# Patient Record
Sex: FEMALE
[De-identification: names, ages, dates, MRNs, and addresses within clinical notes are randomized per-mention and may not be internally consistent; named-entity substitution may affect disease eponyms.]

---

## 2018-05-29 ENCOUNTER — HOSPITAL ENCOUNTER (EMERGENCY)
Dept: HOSPITAL 97 - ER | Age: 38
Discharge: HOME | End: 2018-05-29
Payer: SELF-PAY

## 2018-05-29 DIAGNOSIS — J06.9: Primary | ICD-10-CM

## 2018-05-29 DIAGNOSIS — Z72.0: ICD-10-CM

## 2018-05-29 PROCEDURE — 99282 EMERGENCY DEPT VISIT SF MDM: CPT

## 2018-05-29 NOTE — ER
Nurse's Notes                                                                                     

 [facility]                                                                

Name: Nati Murcia                                                                               

Age: 37 yrs                                                                                       

Sex: Female                                                                                       

: 1980                                                                                   

MRN: [id number]                                                                                   

Arrival Date: 2018                                                                          

Time: 19:15                                                                                       

Account#: [id number]                                                                            

Bed 14                                                                                            

Private MD:                                                                                       

Diagnosis: Acute upper respiratory infection, unspecified                                         

                                                                                                  

Presentation:                                                                                     

                                                                                             

19:33 Presenting complaint: Patient states: "I think I'm getting a sinus infection"; Began 2  lp1 

      days ago with head pressure, dizziness, headache, cough. Transition of care: patient        

      was not received from another setting of care. Onset of symptoms was May 27, 2018. Risk     

      Assessment: Do you want to hurt yourself or someone else? Patient reports no desire to      

      harm self or others. Initial Sepsis Screen: Does the patient meet any 2 criteria? No.       

      Patient's initial sepsis screen is negative. Does the patient have a suspected source       

      of infection? No. Patient's initial sepsis screen is negative. Care prior to arrival:       

      None.                                                                                       

19:33 Method Of Arrival: Ambulatory                                                           lp1 

19:33 Acuity: MITCH 4                                                                           lp1 

                                                                                                  

Triage Assessment:                                                                                

19:36 General: Appears in no apparent distress. Behavior is appropriate for age. Pain:        lp1 

      Complains of pain in head Pain currently is 9 out of 10 on a pain scale. Quality of         

      pain is described as pressure. Neuro: Level of Consciousness is awake, alert, obeys         

      commands, Oriented to person, place, time, situation. Respiratory: Respiratory effort       

      is even, unlabored. Derm: Skin is pink, warm \T\ dry.                                       

                                                                                                  

OB/GYN:                                                                                           

19:34 LMP N/A - Hysterectomy                                                                  lp1 

                                                                                                  

Historical:                                                                                       

- Allergies:                                                                                      

19:35 No Known Allergies;                                                                     lp1 

- Home Meds:                                                                                      

19:35 Butalbital Compound Oral [Active];                                                      lp1 

- PMHx:                                                                                           

19:35 Chronic headaches;                                                                      lp1 

- PSHx:                                                                                           

19:35 Tonsillectomy; Cholecystectomy; Partial hysterectomy;                                   lp1 

                                                                                                  

- Immunization history:: Adult Immunizations up to date.                                          

- Social history:: Smoking status: Patient uses tobacco products, denies chronic                  

  smoking, but will smoke occasionally.                                                           

- Ebola Screening: : No symptoms or risks identified at this time.                                

                                                                                                  

                                                                                                  

Screenin:36 Abuse screen: Denies threats or abuse. Denies injuries from another. Nutritional        lp1 

      screening: No deficits noted. Tuberculosis screening: No symptoms or risk factors           

      identified. Fall Risk None identified.                                                      

                                                                                                  

Assessment:                                                                                       

22:19 General: Appears in no apparent distress. well groomed, Behavior is calm, cooperative.  bb  

      Pain: Complains of pain in sinus area. Neuro: Level of Consciousness is awake, alert,       

      obeys commands, Oriented to person, place, time, situation. Cardiovascular: No deficits     

      noted. Respiratory: Respiratory effort is even, unlabored. GI: No signs and/or symptoms     

      were reported involving the gastrointestinal system. : No signs and/or symptoms were      

      reported regarding the genitourinary system. EENT: Reports nasal congestion. Derm: Skin     

      is pink, warm \T\ dry. Musculoskeletal: Circulation, motion, and sensation intact.          

22:21 Reassessment: pt verbalized understanding of and agrees to plan of care discharge       bb  

      instructions given pt ambulated with steady gait to exit accompanied by family.             

                                                                                                  

Vital Signs:                                                                                      

19:34  / 94; Pulse 80; Resp 18; Temp 98.7(O); Pulse Ox 98% on R/A; Weight 84.37 kg;     lp1 

      Height 5 ft. 3 in. (160.02 cm); Pain 9/10;                                                  

22:22  / 78; Pulse 77; Resp 18 S; Temp 98.1(O); Pulse Ox 100% on R/A;                   bb  

19:34 Body Mass Index 32.95 (84.37 kg, 160.02 cm)                                             lp1 

                                                                                                  

ED Course:                                                                                        

19:15 Patient arrived in ED.                                                                  as  

19:34 Triage completed.                                                                       lp1 

19:34 Arm band placed on right wrist.                                                         lp1 

21:28 Edmund Schmidt PA is [facility].                                                               [id number] 

21:28 Jeff Zazueta MD is Attending Physician.                                                [id number] 

21:32 Sola Motley, RN is Primary Nurse.                                                   bb  

22:19 Patient has correct armband on for positive identification. Call light in reach. Adult  bb  

      w/ patient.                                                                                 

22:19 No provider procedures requiring assistance completed. Patient did not have IV access   bb  

      during this emergency room visit.                                                           

                                                                                                  

Administered Medications:                                                                         

No medications were administered                                                                  

                                                                                                  

                                                                                                  

Outcome:                                                                                          

21:44 Discharge ordered by MD.                                                                [id number] 

22:22 Discharged to home ambulatory, with family.                                             bb  

22:22 Condition: stable                                                                           

22:22 Discharge instructions given to patient, Instructed on discharge instructions, follow       

      up and referral plans. medication usage, Demonstrated understanding of instructions,        

      follow-up care, medications, Prescriptions given X 2.                                       

22:23 Patient left the ED.                                                                    bb  

                                                                                                  

Signatures:                                                                                       

Annie Centeno Brenda RN                     RN   bb                                                   

Fadia Ramsey RN                         RN   lp1                                                  

Edmund Schmidt PA PA   jr8                                                  

                                                                                                  

**************************************************************************************************

## 2018-05-29 NOTE — EDPHYS
Physician Documentation                                                                           

 [facility]                                                                

Name: Nati Murcia                                                                               

Age: 37 yrs                                                                                       

Sex: Female                                                                                       

: 1980                                                                                   

MRN: [id number]                                                                                   

Arrival Date: 2018                                                                          

Time: 19:15                                                                                       

Account#: [id number]                                                                            

Bed 14                                                                                            

Private MD:                                                                                       

ED Physician Jeff Zazueta                                                                         

HPI:                                                                                              

                                                                                             

21:40 This 37 yrs old  Female presents to ER via Ambulatory with complaints of Sinus  jr8 

      Congestion.                                                                                 

21:40 The patient or guardian reports cough, that is intermittent, described as mild, with    jr8 

      productive sputum, that is green. Onset: The symptoms/episode began/occurred acutely,       

      yesterday. Severity of symptoms: At their worst the symptoms were mild, in the              

      emergency department the symptoms are unchanged. Modifying factors: The symptoms are        

      alleviated by nothing, the symptoms are aggravated by nothing. Associated signs and         

      symptoms: Pertinent positives: earache, fever, rhinorrhea, sinus congestion, headache .     

      The patient has not experienced similar symptoms in the past. The patient has not           

      recently seen a physician.                                                                  

                                                                                                  

OB/GYN:                                                                                           

19:34 LMP N/A - Hysterectomy                                                                  lp1 

                                                                                                  

Historical:                                                                                       

- Allergies:                                                                                      

19:35 No Known Allergies;                                                                     lp1 

- Home Meds:                                                                                      

19:35 Butalbital Compound Oral [Active];                                                      lp1 

- PMHx:                                                                                           

19:35 Chronic headaches;                                                                      lp1 

- PSHx:                                                                                           

19:35 Tonsillectomy; Cholecystectomy; Partial hysterectomy;                                   lp1 

                                                                                                  

- Immunization history:: Adult Immunizations up to date.                                          

- Social history:: Smoking status: Patient uses tobacco products, denies chronic                  

  smoking, but will smoke occasionally.                                                           

- Ebola Screening: : No symptoms or risks identified at this time.                                

                                                                                                  

                                                                                                  

ROS:                                                                                              

21:40 Eyes: Negative for injury, pain, redness, and discharge, Neck: Negative for injury,     jr8 

      pain, and swelling, Cardiovascular: Negative for chest pain, palpitations, and edema,       

      Abdomen/GI: Negative for abdominal pain, nausea, vomiting, diarrhea, and constipation,      

      Back: Negative for injury and pain, MS/Extremity: Negative for injury and deformity,        

      Skin: Negative for injury, rash, and discoloration.                                         

21:40 Constitutional: Positive for malaise.                                                       

21:40 ENT: Positive for ear pain, rhinorrhea, sinus congestion.                                   

21:40 Respiratory: Positive for cough, with green sputum.                                         

21:40 Neuro: Positive for headache, Negative for altered mental status, dizziness, gait           

      disturbance, hearing loss, loss of consciousness, numbness, seizure activity, speech        

      changes, syncope, near syncope, tingling, tinnitus, tremor, visual changes, weakness.       

                                                                                                  

Exam:                                                                                             

21:40 Eyes:  Pupils equal round and reactive to light, extra-ocular motions intact.  Lids and jr8 

      lashes normal.  Conjunctiva and sclera are non-icteric and not injected.  Cornea within     

      normal limits.  Periorbital areas with no swelling, redness, or edema. ENT:  Nares          

      patent. No nasal discharge, no septal abnormalities noted.  Tympanic membranes are          

      normal and external auditory canals are clear.  Oropharynx with no redness, swelling,       

      or masses, exudates, or evidence of obstruction, uvula midline.  Mucous membranes           

      moist. Neck:  Trachea midline, no thyromegaly or masses palpated, and no cervical           

      lymphadenopathy.  Supple, full range of motion without nuchal rigidity, or vertebral        

      point tenderness.  No Meningismus. Cardiovascular:  Regular rate and rhythm with a          

      normal S1 and S2.  No gallops, murmurs, or rubs.  Normal PMI, no JVD.  No pulse             

      deficits. Respiratory:  Lungs have equal breath sounds bilaterally, clear to                

      auscultation and percussion.  No rales, rhonchi or wheezes noted.  No increased work of     

      breathing, no retractions or nasal flaring. Abdomen/GI:  Soft, non-tender, with normal      

      bowel sounds.  No distension or tympany.  No guarding or rebound.  No evidence of           

      tenderness throughout. Back:  No spinal tenderness.  No costovertebral tenderness.          

      Full range of motion. Skin:  Warm, dry with normal turgor.  Normal color with no            

      rashes, no lesions, and no evidence of cellulitis. MS/ Extremity:  Pulses equal, no         

      cyanosis.  Neurovascular intact.  Full, normal range of motion. Neuro:  Awake and           

      alert, GCS 15, oriented to person, place, time, and situation.  Cranial nerves II-XII       

      grossly intact.  Motor strength 5/5 in all extremities.  Sensory grossly intact.            

      Cerebellar exam normal.  Normal gait.                                                       

21:40 Head/face: Sinus tenderness, that is mild, that is moderate, is located over the  left      

      frontal sinus and left maxillary sinus.                                                     

                                                                                                  

Vital Signs:                                                                                      

19:34  / 94; Pulse 80; Resp 18; Temp 98.7(O); Pulse Ox 98% on R/A; Weight 84.37 kg;     lp1 

      Height 5 ft. 3 in. (160.02 cm); Pain 9/10;                                                  

22:22  / 78; Pulse 77; Resp 18 S; Temp 98.1(O); Pulse Ox 100% on R/A;                   bb  

19:34 Body Mass Index 32.95 (84.37 kg, 160.02 cm)                                             lp1 

                                                                                                  

MDM:                                                                                              

21:28 Patient medically screened.                                                             jr8 

21:40 Data reviewed: vital signs, nurses notes, and as a result, I will discharge patient.    jr8 

      Data interpreted: Pulse oximetry: on room air is 98 %. Interpretation: normal.              

      Counseling: I had a detailed discussion with the patient and/or guardian regarding: the     

      historical points, exam findings, and any diagnostic results supporting the                 

      discharge/admit diagnosis, lab results, the need for outpatient follow up, a family         

      practitioner, to return to the emergency department if symptoms worsen or persist or if     

      there are any questions or concerns that arise at home.                                     

                                                                                                  

Administered Medications:                                                                         

No medications were administered                                                                  

                                                                                                  

                                                                                                  

Disposition:                                                                                      

                                                                                             

06:46 Co-signature as Attending Physician, Jeff Zazueta MD.                                    rn  

                                                                                                  

Disposition:                                                                                      

18 21:44 Discharged to Home. Impression: Acute upper respiratory infection, unspecified.    

- Condition is Stable.                                                                            

- Discharge Instructions: Upper Respiratory Infection, Adult.                                     

- Prescriptions for Claritin- D 24 Hour  mg Oral Tablet Sustained Release 24 hr -           

  take 1 tablet by ORAL route once daily As needed; 20 tablet. Zithromax Z- Hilario 250 mg            

  Oral Tablet - take 1 tablet by ORAL route as directed for 5 days Day 1 - take two (2)           

  tablets one time. Day 2, 3, 4 , 5 take one (1) tablet once daily.; 6 tablet.                    

- Medication Reconciliation Form, Thank You Letter, Antibiotic Education, Prescription            

  Opioid Use form.                                                                                

- Follow up: Private Physician; When: 1 week; Reason: Recheck today's complaints,                 

  Continuance of care, Re-evaluation by your physician.                                           

- Problem is new.                                                                                 

- Symptoms have improved.                                                                         

                                                                                                  

                                                                                                  

                                                                                                  

Signatures:                                                                                       

Sola Motley RN RN bb Nieto, Roman, MD MD rn Pena, Laura, RN                         RN   lp1                                                  

Edmudn Schmidt PA PA   jr8                                                  

                                                                                                  

Corrections: (The following items were deleted from the chart)                                    

                                                                                             

22:23 21:44 2018 21:44 Discharged to Home. Impression: Acute upper respiratory          bb  

      infection, unspecified. Condition is Stable. Forms are Medication Reconciliation Form,      

      Thank You Letter, Antibiotic Education, Prescription Opioid Use. Follow up: Private         

      Physician; When: 1 week; Reason: Recheck today's complaints, Continuance of care,           

      Re-evaluation by your physician. Problem is new. Symptoms have improved. jr8                

                                                                                                  

**************************************************************************************************